# Patient Record
Sex: MALE | Race: WHITE
[De-identification: names, ages, dates, MRNs, and addresses within clinical notes are randomized per-mention and may not be internally consistent; named-entity substitution may affect disease eponyms.]

---

## 2017-04-25 ENCOUNTER — HOSPITAL ENCOUNTER (EMERGENCY)
Dept: HOSPITAL 62 - ER | Age: 82
Discharge: HOME | End: 2017-04-25
Payer: MEDICARE

## 2017-04-25 VITALS — SYSTOLIC BLOOD PRESSURE: 157 MMHG | DIASTOLIC BLOOD PRESSURE: 80 MMHG

## 2017-04-25 DIAGNOSIS — R07.81: ICD-10-CM
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
## 2017-04-25 DIAGNOSIS — Z88.2: ICD-10-CM

## 2017-04-25 DIAGNOSIS — R06.02: ICD-10-CM

## 2017-04-25 DIAGNOSIS — Z87.81: ICD-10-CM

## 2017-04-25 DIAGNOSIS — S20.211A: Primary | ICD-10-CM

## 2017-04-25 DIAGNOSIS — I10: ICD-10-CM

## 2017-04-25 LAB
ALBUMIN SERPL-MCNC: 4.3 G/DL (ref 3.5–5)
ALP SERPL-CCNC: 94 U/L (ref 38–126)
ALT SERPL-CCNC: 40 U/L (ref 21–72)
ANION GAP SERPL CALC-SCNC: 14 MMOL/L (ref 5–19)
AST SERPL-CCNC: 38 U/L (ref 17–59)
BASOPHILS # BLD AUTO: 0 10^3/UL (ref 0–0.2)
BASOPHILS NFR BLD AUTO: 0.2 % (ref 0–2)
BILIRUB DIRECT SERPL-MCNC: 0.4 MG/DL (ref 0–0.4)
BILIRUB SERPL-MCNC: 0.6 MG/DL (ref 0.2–1.3)
BUN SERPL-MCNC: 32 MG/DL (ref 7–20)
CALCIUM: 9.5 MG/DL (ref 8.4–10.2)
CHLORIDE SERPL-SCNC: 109 MMOL/L (ref 98–107)
CO2 SERPL-SCNC: 23 MMOL/L (ref 22–30)
CREAT SERPL-MCNC: 2.12 MG/DL (ref 0.52–1.25)
EOSINOPHIL # BLD AUTO: 0.1 10^3/UL (ref 0–0.6)
EOSINOPHIL NFR BLD AUTO: 0.6 % (ref 0–6)
ERYTHROCYTE [DISTWIDTH] IN BLOOD BY AUTOMATED COUNT: 15.3 % (ref 11.5–14)
GLUCOSE SERPL-MCNC: 114 MG/DL (ref 75–110)
HCT VFR BLD CALC: 39.8 % (ref 37.9–51)
HGB BLD-MCNC: 13.2 G/DL (ref 13.5–17)
HGB HCT DIFFERENCE: -0.2
LYMPHOCYTES # BLD AUTO: 1.3 10^3/UL (ref 0.5–4.7)
LYMPHOCYTES NFR BLD AUTO: 7.3 % (ref 13–45)
MCH RBC QN AUTO: 31.3 PG (ref 27–33.4)
MCHC RBC AUTO-ENTMCNC: 33 G/DL (ref 32–36)
MCV RBC AUTO: 95 FL (ref 80–97)
MONOCYTES # BLD AUTO: 1.5 10^3/UL (ref 0.1–1.4)
MONOCYTES NFR BLD AUTO: 8.2 % (ref 3–13)
NEUTROPHILS # BLD AUTO: 15.4 10^3/UL (ref 1.7–8.2)
NEUTS SEG NFR BLD AUTO: 83.7 % (ref 42–78)
POTASSIUM SERPL-SCNC: 5.2 MMOL/L (ref 3.6–5)
PROT SERPL-MCNC: 7.5 G/DL (ref 6.3–8.2)
RBC # BLD AUTO: 4.21 10^6/UL (ref 4.35–5.55)
SODIUM SERPL-SCNC: 145.7 MMOL/L (ref 137–145)
WBC # BLD AUTO: 18.4 10^3/UL (ref 4–10.5)

## 2017-04-25 PROCEDURE — 72125 CT NECK SPINE W/O DYE: CPT

## 2017-04-25 PROCEDURE — 70450 CT HEAD/BRAIN W/O DYE: CPT

## 2017-04-25 PROCEDURE — 36415 COLL VENOUS BLD VENIPUNCTURE: CPT

## 2017-04-25 PROCEDURE — 99284 EMERGENCY DEPT VISIT MOD MDM: CPT

## 2017-04-25 PROCEDURE — 80053 COMPREHEN METABOLIC PANEL: CPT

## 2017-04-25 PROCEDURE — 71250 CT THORAX DX C-: CPT

## 2017-04-25 PROCEDURE — 85025 COMPLETE CBC W/AUTO DIFF WBC: CPT

## 2017-04-25 NOTE — ER DOCUMENT REPORT
ED General





- General


Chief Complaint: Fall


Stated Complaint: FALL RIB PAIN


Mode of Arrival: Wheelchair


Information source: Patient


Notes: 


86-year-old male presents with complaints of right rib pain after a fall.  

Patient notes he slipped and landed on his right side felt the right upper ribs 

cracked.  Patient has had a history of multiple fractures in the past admits to 

pain with shortness of breath


TRAVEL OUTSIDE OF THE [place] IN LAST 30 DAYS: No





- HPI


Onset: Just prior to arrival


Onset/Duration: Sudden


Quality of pain: Achy


Severity: Mild


Pain Level: 1


Associated symptoms: Chest pain, Shortness of breath


Exacerbated by: Denies


Relieved by: Denies


Similar symptoms previously: Yes


Recently seen / treated by doctor: Yes





- Related Data


Allergies/Adverse Reactions: 


 





Sulfa (Sulfonamide Antibiotics) Allergy (Verified 04/25/17 15:14)


 











Past Medical History





- Social History


Smoking Status: Never Smoker


Cigarette use (# per day): No


Chew tobacco use (# tins/day): No


Smoking Education Provided: No


Family History: Reviewed & Not Pertinent


Patient has suicidal ideation: No


Patient has homicidal ideation: No





- Past Medical History


Cardiac Medical History: Reports: Hx Hypertension - med controlled


   Denies: Hx Heart Attack


Pulmonary Medical History: 


   Denies: Hx Asthma


Neurological Medical History: Denies: Hx Cerebrovascular Accident, Hx Seizures


Renal/ Medical History: Denies: Hx Peritoneal Dialysis


GI Medical History: Denies: Hx Hepatitis, Hx Hiatal Hernia, Hx Ulcer


Infectious Medical History: Denies: Hx Hepatitis


Past Surgical History: Denies: Hx Open Heart Surgery, Hx Pacemaker





Review of Systems





- Review of Systems


Notes: 


REVIEW OF SYSTEMS:


CONSTITUTIONAL :  Denies fever,  chills, or sweats.  Denies recent illness.


EENT:   Denies eye, ear, throat, or mouth pain or symptoms.  Denies nasal or 

sinus congestion or discharge.  Denies throat, tongue, or mouth swelling or 

difficulty swallowing.


CARDIOVASCULAR:  Denies chest pain.  Denies palpitations or racing or irregular 

heart beat.  Denies ankle edema.


RESPIRATORY: Admits to rib pain shortness of breath


GASTROINTESTINAL:  Denies abdominal pain or distention.  Denies nausea, vomiting

, or diarrhea.  Denies blood in vomitus, stools, or per rectum.  Denies black, 

tarry stools.  Denies constipation.  


GENITOURINARY:  Denies difficulty urinating, painful urination, burning, 

frequency, blood in urine, or discharge.


MUSCULOSKELETAL:  Denies back or neck pain or stiffness.  Denies joint pain or 

swelling.


SKIN:   Denies rash, lesions or sores.


HEMATOLOGIC :   Denies easy bruising or bleeding.


LYMPHATIC:  Denies swollen, enlarged glands.


NEUROLOGICAL:  Denies confusion or altered mental status.  Denies passing out 

or loss of consciousness.  Denies dizziness or lightheadedness.  Denies 

headache.  Denies weakness or paralysis or loss of use of either side.  Denies 

problems with gait or speech.  Denies sensory loss, numbness, or tingling.  

Denies seizures.


PSYCHIATRIC:  Denies anxiety or stress.  Denies depression, suicidal ideation, 

or homicidal ideation.





ALL OTHER SYSTEMS REVIEWED AND NEGATIVE.





Dictation was performed using Dragon voice recognition software 











PHYSICAL EXAMINATION:





GENERAL: Well-appearing, well-nourished and in mild distress.





HEAD: Atraumatic, normocephalic.





EYES: Pupils equal round and reactive to light, extraocular movements intact, 

sclera anicteric, conjunctiva are normal.





ENT: Nares patent, oropharynx clear without exudates.  Moist mucous membranes.





NECK: Normal range of motion, supple without lymphadenopathy





LUNGS: Breath sounds clear to auscultation bilaterally and equal.  No wheezes 

rales or rhonchi.  Tenderness on palpation of the right ribs posterior 3 

through 7





HEART: Regular rate and rhythm without murmurs





ABDOMEN: Soft, nontender, nondistended abdomen.  No guarding, no rebound.  No 

masses appreciated.





Musculoskeletal: Normal range of motion, no pitting or edema.  No cyanosis.





NEUROLOGICAL: Cranial nerves grossly intact.  Normal speech, normal gait.  

Normal sensory, motor exams 





PSYCH: Normal mood, normal affect.





SKIN: Warm, Dry, normal turgor, no rashes or lesions noted.





Physical Exam





- Vital signs


Vitals: 


 











Temp Pulse Resp BP Pulse Ox


 


 97.4 F   81   16   148/78 H  98 


 


 04/25/17 15:16  04/25/17 15:16  04/25/17 15:16  04/25/17 15:16  04/25/17 15:16














Course





- Re-evaluation


Re-evalutation: 





04/25/17 17:04


CT noted no acute fractures, patient is obviously tender and will be given 

incentive spirometer and follow-up with primary care physician








After performing a Medical Screening Examination, I estimate there is LOW risk 

for INTRACRANIAL HEMORRHAGE, UNSTABLE SPINE FRACTURE, CENTRAL CORD SYNDROME, 

CAUDA EQUINA, THORACIC AORTIC DISSECTION, PNEUMOTHORAX, PERFORATED BOWEL, 

RUPTURED ABDOMINAL AORTIC ANEURYSM, ACUTE TENDON RUPTURE, COMPARTMENT SYNDROME, 

or OPEN FRACTURE, thus I consider the discharge disposition reasonable. Also, 

there is no evidence or peritonitis, sepsis, or toxicity.  I have reevaluated 

this patient multiple times and no significant life threatening changes are 

noted. The patient and I have discussed the diagnosis and risks, and we agree 

with discharging home to follow-up with their primary doctor with the 

understanding that symptoms and presentations can change. We also discussed 

returning to the Emergency Department immediately if new or worsening symptoms 

occur. We have discussed the symptoms which are most concerning (e.g., bloody 

stool, fever, changing or worsening pain, vomiting) that necessitate immediate 

return.





- Vital Signs


Vital signs: 


 











Temp Pulse Resp BP Pulse Ox


 


 97.4 F   81   16   148/78 H  98 


 


 04/25/17 15:16  04/25/17 15:16  04/25/17 15:16  04/25/17 15:16  04/25/17 15:16














- Laboratory


Result Diagrams: 


 04/25/17 16:06





 04/25/17 16:06


Laboratory results interpreted by me: 


 











  04/25/17 04/25/17





  16:06 16:06


 


WBC  18.4 H 


 


RBC  4.21 L 


 


Hgb  13.2 L 


 


RDW  15.3 H 


 


Seg Neutrophils %  83.7 H 


 


Lymphocytes %  7.3 L 


 


Absolute Neutrophils  15.4 H 


 


Absolute Monocytes  1.5 H 


 


Sodium   145.7 H


 


Potassium   5.2 H


 


Chloride   109 H


 


BUN   32 H


 


Creatinine   2.12 H


 


Est GFR ( Amer)   36 L


 


Est GFR (Non-Af Amer)   30 L


 


Glucose   114 H














- Diagnostic Test


Radiology reviewed: Image reviewed, Reports reviewed





Discharge





- Discharge


Clinical Impression: 


Contusion of rib on right side


Qualifiers:


 Encounter type: initial encounter Qualified Code(s): S20.211A - Contusion of 

right front wall of thorax, initial encounter





Fall


Qualifiers:


 Encounter type: initial encounter Qualified Code(s): W19.XXXA - Unspecified 

fall, initial encounter





Condition: Stable


Disposition: HOME, SELF-CARE


Instructions:  Rib Contusion ([facility])


Additional Instructions: 


Follow up with your physician tomorrow for further care or return to the ED 

IMMEDIATELY if symptoms worsen or new concerns occur. If you cannot afford to 

follow up with your primary care physician a list of low cost clinics have been 

provided at the end of your discharge papers as well.


Prescriptions: 


Oxycodone HCl/Acetaminophen [Percocet 5-325 mg Tablet] 1 - 2 tab PO Q4H PRN #15 

tablet


 PRN Reason:

## 2018-08-06 ENCOUNTER — HOSPITAL ENCOUNTER (OUTPATIENT)
Dept: HOSPITAL 62 - OD | Age: 83
End: 2018-08-06
Attending: UROLOGY
Payer: MEDICARE

## 2018-08-06 DIAGNOSIS — C61: Primary | ICD-10-CM

## 2018-08-06 PROCEDURE — 84153 ASSAY OF PSA TOTAL: CPT

## 2018-08-06 PROCEDURE — 36415 COLL VENOUS BLD VENIPUNCTURE: CPT
